# Patient Record
Sex: FEMALE | NOT HISPANIC OR LATINO | URBAN - METROPOLITAN AREA
[De-identification: names, ages, dates, MRNs, and addresses within clinical notes are randomized per-mention and may not be internally consistent; named-entity substitution may affect disease eponyms.]

---

## 2021-10-05 ENCOUNTER — APPOINTMENT (RX ONLY)
Dept: URBAN - METROPOLITAN AREA CLINIC 251 | Facility: CLINIC | Age: 31
Setting detail: DERMATOLOGY
End: 2021-10-05

## 2021-10-05 DIAGNOSIS — L73.2 HIDRADENITIS SUPPURATIVA: ICD-10-CM | Status: INADEQUATELY CONTROLLED

## 2021-10-05 DIAGNOSIS — Z41.9 ENCOUNTER FOR PROCEDURE FOR PURPOSES OTHER THAN REMEDYING HEALTH STATE, UNSPECIFIED: ICD-10-CM

## 2021-10-05 PROCEDURE — ? ADDITIONAL NOTES

## 2021-10-05 PROCEDURE — ? PRESCRIPTION

## 2021-10-05 PROCEDURE — ? TREATMENT REGIMEN

## 2021-10-05 PROCEDURE — ? COUNSELING

## 2021-10-05 PROCEDURE — ? COSMETIC CONSULTATION: BOTOX

## 2021-10-05 PROCEDURE — 99204 OFFICE O/P NEW MOD 45 MIN: CPT

## 2021-10-05 RX ORDER — CLINDAMYCIN PHOSPHATE 10 MG/ML
1% LOTION TOPICAL QD
Qty: 1 | Refills: 2 | Status: ERX | COMMUNITY
Start: 2021-10-05

## 2021-10-05 RX ADMIN — CLINDAMYCIN PHOSPHATE 1%: 10 LOTION TOPICAL at 00:00

## 2021-10-05 ASSESSMENT — LOCATION DETAILED DESCRIPTION DERM
LOCATION DETAILED: MONS PUBIS
LOCATION DETAILED: SUBXIPHOID

## 2021-10-05 ASSESSMENT — LOCATION ZONE DERM
LOCATION ZONE: VULVA
LOCATION ZONE: TRUNK

## 2021-10-05 ASSESSMENT — LOCATION SIMPLE DESCRIPTION DERM
LOCATION SIMPLE: GROIN
LOCATION SIMPLE: ABDOMEN

## 2021-10-05 NOTE — PROCEDURE: TREATMENT REGIMEN
Detail Level: Zone
Initiate Treatment: clindamycin 1 % lotion QD
Plan: consider oral abx +/- Humira (caution with history of CA) vs referral to [facility]
Otc Regimen: Panoxyl 4% wash QD

## 2024-09-12 ENCOUNTER — OFFICE VISIT (OUTPATIENT)
Dept: RHEUMATOLOGY | Age: 34
End: 2024-09-12
Payer: COMMERCIAL

## 2024-09-12 ENCOUNTER — LAB (OUTPATIENT)
Dept: LAB | Age: 34
End: 2024-09-12

## 2024-09-12 VITALS
HEIGHT: 60 IN | HEART RATE: 75 BPM | SYSTOLIC BLOOD PRESSURE: 114 MMHG | WEIGHT: 203.4 LBS | DIASTOLIC BLOOD PRESSURE: 76 MMHG | BODY MASS INDEX: 39.93 KG/M2

## 2024-09-12 DIAGNOSIS — M25.50 MULTIPLE JOINT PAIN: ICD-10-CM

## 2024-09-12 DIAGNOSIS — R76.8 POSITIVE ANA (ANTINUCLEAR ANTIBODY): ICD-10-CM

## 2024-09-12 DIAGNOSIS — R76.8 POSITIVE ANA (ANTINUCLEAR ANTIBODY): Primary | ICD-10-CM

## 2024-09-12 LAB
ALBUMIN SERPL BCG-MCNC: 4.4 G/DL (ref 3.5–5.1)
ALP SERPL-CCNC: 82 U/L (ref 38–126)
ALT SERPL W/O P-5'-P-CCNC: 22 U/L (ref 11–66)
ANION GAP SERPL CALC-SCNC: 15 MEQ/L (ref 8–16)
AST SERPL-CCNC: 18 U/L (ref 5–40)
BASOPHILS ABSOLUTE: 0 THOU/MM3 (ref 0–0.1)
BASOPHILS NFR BLD AUTO: 0.2 %
BILIRUB SERPL-MCNC: 1.7 MG/DL (ref 0.3–1.2)
BUN SERPL-MCNC: 8 MG/DL (ref 7–22)
C3C SERPL-MCNC: 115 MG/DL (ref 90–180)
C4 SERPL-MCNC: 5 MG/DL (ref 10–40)
CALCIUM SERPL-MCNC: 9.1 MG/DL (ref 8.5–10.5)
CHLORIDE SERPL-SCNC: 99 MEQ/L (ref 98–111)
CO2 SERPL-SCNC: 25 MEQ/L (ref 23–33)
CREAT SERPL-MCNC: 0.7 MG/DL (ref 0.4–1.2)
CREAT UR-MCNC: 117.4 MG/DL
CRP SERPL-MCNC: < 0.3 MG/DL (ref 0–1)
DEPRECATED RDW RBC AUTO: 46.1 FL (ref 35–45)
EOSINOPHIL NFR BLD AUTO: 1.4 %
EOSINOPHILS ABSOLUTE: 0.1 THOU/MM3 (ref 0–0.4)
ERYTHROCYTE [DISTWIDTH] IN BLOOD BY AUTOMATED COUNT: 14.9 % (ref 11.5–14.5)
ERYTHROCYTE [SEDIMENTATION RATE] IN BLOOD BY WESTERGREN METHOD: 49 MM/HR (ref 0–20)
GFR SERPL CREATININE-BSD FRML MDRD: > 90 ML/MIN/1.73M2
GLUCOSE SERPL-MCNC: 81 MG/DL (ref 70–108)
HCT VFR BLD AUTO: 40.2 % (ref 37–47)
HGB BLD-MCNC: 12.1 GM/DL (ref 12–16)
IMM GRANULOCYTES # BLD AUTO: 0.02 THOU/MM3 (ref 0–0.07)
IMM GRANULOCYTES NFR BLD AUTO: 0.5 %
LYMPHOCYTES ABSOLUTE: 1.3 THOU/MM3 (ref 1–4.8)
LYMPHOCYTES NFR BLD AUTO: 30.7 %
MCH RBC QN AUTO: 25.7 PG (ref 26–33)
MCHC RBC AUTO-ENTMCNC: 30.1 GM/DL (ref 32.2–35.5)
MCV RBC AUTO: 85.5 FL (ref 81–99)
MONOCYTES ABSOLUTE: 0.3 THOU/MM3 (ref 0.4–1.3)
MONOCYTES NFR BLD AUTO: 7.6 %
NEUTROPHILS ABSOLUTE: 2.6 THOU/MM3 (ref 1.8–7.7)
NEUTROPHILS NFR BLD AUTO: 59.6 %
NRBC BLD AUTO-RTO: 0 /100 WBC
PLATELET # BLD AUTO: 424 THOU/MM3 (ref 130–400)
PMV BLD AUTO: 9.7 FL (ref 9.4–12.4)
POTASSIUM SERPL-SCNC: 4.3 MEQ/L (ref 3.5–5.2)
PROT SERPL-MCNC: 8.2 G/DL (ref 6.1–8)
PROT UR-MCNC: 9 MG/DL
PROT/CREAT 24H UR: 0.08 MG/G{CREAT}
RBC # BLD AUTO: 4.7 MILL/MM3 (ref 4.2–5.4)
SODIUM SERPL-SCNC: 139 MEQ/L (ref 135–145)
WBC # BLD AUTO: 4.3 THOU/MM3 (ref 4.8–10.8)

## 2024-09-12 PROCEDURE — 99204 OFFICE O/P NEW MOD 45 MIN: CPT | Performed by: INTERNAL MEDICINE

## 2024-09-12 RX ORDER — ALPRAZOLAM 0.5 MG
0.5 TABLET ORAL DAILY
COMMUNITY

## 2024-09-12 RX ORDER — LEVOTHYROXINE SODIUM 150 UG/1
TABLET ORAL
COMMUNITY

## 2024-09-12 RX ORDER — TRAZODONE HYDROCHLORIDE 50 MG/1
TABLET, FILM COATED ORAL
COMMUNITY
Start: 2016-08-01

## 2024-09-12 RX ORDER — DULOXETIN HYDROCHLORIDE 60 MG/1
60 CAPSULE, DELAYED RELEASE ORAL DAILY
COMMUNITY

## 2024-09-12 RX ORDER — OXYCODONE HYDROCHLORIDE 5 MG/1
2.5-5 TABLET ORAL 2 TIMES DAILY PRN
COMMUNITY
Start: 2023-03-17

## 2024-09-12 ASSESSMENT — ENCOUNTER SYMPTOMS
COUGH: 0
SHORTNESS OF BREATH: 0
VOMITING: 0
BLOOD IN STOOL: 0
ABDOMINAL PAIN: 0
NAUSEA: 0

## 2024-10-07 ENCOUNTER — OFFICE VISIT (OUTPATIENT)
Dept: RHEUMATOLOGY | Age: 34
End: 2024-10-07
Payer: COMMERCIAL

## 2024-10-07 VITALS
HEIGHT: 60 IN | WEIGHT: 207 LBS | HEART RATE: 70 BPM | SYSTOLIC BLOOD PRESSURE: 118 MMHG | BODY MASS INDEX: 40.64 KG/M2 | OXYGEN SATURATION: 100 % | DIASTOLIC BLOOD PRESSURE: 64 MMHG

## 2024-10-07 DIAGNOSIS — M32.9 SLE (SYSTEMIC LUPUS ERYTHEMATOSUS RELATED SYNDROME) (HCC): Primary | ICD-10-CM

## 2024-10-07 PROCEDURE — 99214 OFFICE O/P EST MOD 30 MIN: CPT | Performed by: NURSE PRACTITIONER

## 2024-10-07 RX ORDER — HYDROXYCHLOROQUINE SULFATE 200 MG/1
200 TABLET, FILM COATED ORAL DAILY
Qty: 30 TABLET | Refills: 2 | Status: SHIPPED | OUTPATIENT
Start: 2024-10-07 | End: 2025-01-05

## 2024-10-07 RX ORDER — CEPHALEXIN 500 MG/1
CAPSULE ORAL
COMMUNITY
Start: 2024-09-30 | End: 2024-10-10

## 2024-10-07 ASSESSMENT — ENCOUNTER SYMPTOMS
EYE PAIN: 0
BLOOD IN STOOL: 0
BACK PAIN: 0
COLOR CHANGE: 0
DIARRHEA: 0
TROUBLE SWALLOWING: 0
SHORTNESS OF BREATH: 0
ABDOMINAL PAIN: 0

## 2024-10-07 ASSESSMENT — JOINT PAIN
TOTAL NUMBER OF SWOLLEN JOINTS: 0
TOTAL NUMBER OF TENDER JOINTS: 4

## 2024-10-07 NOTE — PROGRESS NOTES
[facility]   Rheumatology Clinic Note      10/7/2024       CHIEF COMPLAINT:    Chief Complaint   Patient presents with    Follow-up     2 week f/u positive ZAIDA  She is having pain in her back. Her pain is a 5.           HISTORY OF PRESENT ILLNESS:    34 y.o. female with systemic lupus erythematosus presents for follow up. Patient had a strong positive ZAIDA, +SSA, +CLP, low + RF, low C3.    Patient endorses bilateral hand and finger pain. She notes intermittent swelling to the left hand fingers. Patient has morning stiffness that lasts approximately 4-5 hours. She states after further reflection, since her last visit, she does note that she gets oral ulcers. She gets these about once a week.     Denies skin rash, photosensitivity, and Raynaud's.    She states she saw ENT last week and was told she has a right parotid mass for which she was given Keflex. She says they did a biopsy and it was not malignant. Patient is supposed to have a CT Scan done.    Patient has a history of hidradenitis suppurativa which she typically has in the groin region and armpits.    Recap:  Hx of chronic back pain 2/2 DDD 2/2 radiation therapy, follows with pain management.  Hx of alopecia areata in the past, but these areas have grown back.  Hx of pheochromocytoma 2003 (age 12) with mets to the spine. She underwent surgical intervention followed by MIBG I-131 therapy & radiation therapy to the spine.   Hx of papillary carcinoma of the thyroid gland 2022 which was treated with 125mCi I-131.    Strong +ZAIDA, +SSA, +CLP, low +RF, low C3    Past Medical History:     has a past medical history of Endometriosis, Parotid mass, Pheochromocytoma, and Thyroid cancer (HCC).    Past Surgical History:     has a past surgical history that includes Adrenal gland surgery and Thyroidectomy.    Current Medications:      Current Outpatient Medications:     naloxone (NARCAN) 4 MG/0.1ML LIQD nasal spray, If unresponsive, call 911 and give one spray

## 2024-11-16 LAB
C3 COMPLEMENT: 120 MG/DL (ref 90–180)
C4 COMPLEMENT: 6 MG/DL (ref 10–40)

## 2024-11-18 LAB — DSDNA ANTIBODY: <201 EIA

## 2024-11-20 ENCOUNTER — OFFICE VISIT (OUTPATIENT)
Dept: RHEUMATOLOGY | Age: 34
End: 2024-11-20
Payer: COMMERCIAL

## 2024-11-20 VITALS
DIASTOLIC BLOOD PRESSURE: 68 MMHG | HEIGHT: 60 IN | SYSTOLIC BLOOD PRESSURE: 120 MMHG | HEART RATE: 62 BPM | WEIGHT: 207.01 LBS | BODY MASS INDEX: 40.64 KG/M2

## 2024-11-20 DIAGNOSIS — M32.9 SLE (SYSTEMIC LUPUS ERYTHEMATOSUS RELATED SYNDROME) (HCC): Primary | ICD-10-CM

## 2024-11-20 PROCEDURE — 99214 OFFICE O/P EST MOD 30 MIN: CPT | Performed by: INTERNAL MEDICINE

## 2024-11-20 ASSESSMENT — ENCOUNTER SYMPTOMS
TROUBLE SWALLOWING: 0
SHORTNESS OF BREATH: 0
BLOOD IN STOOL: 0
ABDOMINAL PAIN: 0
COLOR CHANGE: 0
DIARRHEA: 0
EYE PAIN: 0
BACK PAIN: 0

## 2024-11-20 ASSESSMENT — JOINT PAIN
TOTAL NUMBER OF SWOLLEN JOINTS: 0
TOTAL NUMBER OF TENDER JOINTS: 4

## 2024-11-20 NOTE — PROGRESS NOTES
[facility] RHEUMATOLOGY  [address]  [address]  [place] [id number]  [phone]

## 2024-12-30 DIAGNOSIS — M32.9 SLE (SYSTEMIC LUPUS ERYTHEMATOSUS RELATED SYNDROME) (HCC): ICD-10-CM

## 2025-01-02 RX ORDER — HYDROXYCHLOROQUINE SULFATE 200 MG/1
200 TABLET, FILM COATED ORAL DAILY
Qty: 30 TABLET | Refills: 2 | Status: SHIPPED | OUTPATIENT
Start: 2025-01-02 | End: 2025-04-02

## 2025-03-31 DIAGNOSIS — M32.9 SLE (SYSTEMIC LUPUS ERYTHEMATOSUS RELATED SYNDROME) (HCC): ICD-10-CM

## 2025-03-31 RX ORDER — HYDROXYCHLOROQUINE SULFATE 200 MG/1
200 TABLET, FILM COATED ORAL DAILY
Qty: 90 TABLET | Refills: 1 | Status: SHIPPED | OUTPATIENT
Start: 2025-03-31 | End: 2025-09-27

## 2025-05-19 LAB
ALBUMIN: 4.3 G/DL
ALP BLD-CCNC: 72 U/L
ALT SERPL-CCNC: 32 U/L
ANION GAP SERPL CALCULATED.3IONS-SCNC: 8 MMOL/L
AST SERPL-CCNC: 31 U/L
BASOPHILS ABSOLUTE: 0 /ΜL
BASOPHILS RELATIVE PERCENT: 0.6 %
BILIRUB SERPL-MCNC: 1.2 MG/DL (ref 0.1–1.4)
BUN BLDV-MCNC: 10 MG/DL
C-REACTIVE PROTEIN: 5
CALCIUM SERPL-MCNC: 9.3 MG/DL
CHLORIDE BLD-SCNC: 103 MMOL/L
CO2: 24 MMOL/L
CREAT SERPL-MCNC: 0.8 MG/DL
EOSINOPHILS ABSOLUTE: 0.2 /ΜL
EOSINOPHILS RELATIVE PERCENT: 3.9 %
GFR, ESTIMATED: 90
GLUCOSE BLD-MCNC: 94 MG/DL
HCT VFR BLD CALC: 33.1 % (ref 36–46)
HEMOGLOBIN: 9.8 G/DL (ref 12–16)
LYMPHOCYTES ABSOLUTE: 1.2 /ΜL
LYMPHOCYTES RELATIVE PERCENT: 22.7 %
MCH RBC QN AUTO: 21.1 PG
MCHC RBC AUTO-ENTMCNC: 29.6 G/DL
MCV RBC AUTO: 71.3 FL
MONOCYTES ABSOLUTE: 0.4 /ΜL
MONOCYTES RELATIVE PERCENT: 8.4 %
NEUTROPHILS ABSOLUTE: 3.3 /ΜL
NEUTROPHILS RELATIVE PERCENT: 64.2 %
PDW BLD-RTO: 38.1 %
PLATELET # BLD: 474 K/ΜL
PMV BLD AUTO: 9.4 FL
POTASSIUM SERPL-SCNC: 4.5 MMOL/L
RBC # BLD: 4.64 10^6/ΜL
SED RATE, AUTOMATED: 35
SODIUM BLD-SCNC: 135 MMOL/L
TOTAL PROTEIN: 8.3 G/DL (ref 6.4–8.2)
WBC # BLD: 5.1 10^3/ML

## 2025-05-20 ENCOUNTER — OFFICE VISIT (OUTPATIENT)
Age: 35
End: 2025-05-20
Payer: COMMERCIAL

## 2025-05-20 VITALS
HEART RATE: 82 BPM | BODY MASS INDEX: 40.23 KG/M2 | SYSTOLIC BLOOD PRESSURE: 116 MMHG | HEIGHT: 60 IN | WEIGHT: 204.9 LBS | OXYGEN SATURATION: 96 % | DIASTOLIC BLOOD PRESSURE: 78 MMHG

## 2025-05-20 DIAGNOSIS — M32.9 SLE (SYSTEMIC LUPUS ERYTHEMATOSUS RELATED SYNDROME) (HCC): Primary | ICD-10-CM

## 2025-05-20 PROCEDURE — 99214 OFFICE O/P EST MOD 30 MIN: CPT | Performed by: NURSE PRACTITIONER

## 2025-05-20 ASSESSMENT — ENCOUNTER SYMPTOMS
ABDOMINAL PAIN: 0
DIARRHEA: 0
SHORTNESS OF BREATH: 0
BLOOD IN STOOL: 0
TROUBLE SWALLOWING: 0
BACK PAIN: 1
EYE PAIN: 0
COLOR CHANGE: 0

## 2025-05-20 NOTE — PROGRESS NOTES
[facility]   Rheumatology Clinic Note      5/20/2025       CHIEF COMPLAINT:    Chief Complaint   Patient presents with    6 Month Follow-Up     SLE (systemic lupus erythematosus related syndrome) (HCC)    Joint Pain     Back pain  Pain level 4            HISTORY OF PRESENT ILLNESS:    34 y.o. female with systemic lupus erythematosus (strong +ZAIDA, +SSA, +CLP, + RF, low C3)  presents for follow up. Presently she is on hydroxychloroquine 200 mg daily.    Overall the patient is doing well. She continues to have chronic low back pain. Denies other joint pain. Denies episodes of painful, swollen joints over the last 6 months. She has morning stiffness that lasts an hour which is much improved. She has oral ulcers a couple times a month. Denies skin rash, but states she was in [place] and noticed sensitivity to the heat/sun.    She was recently treated for pneumonia. Patient states she was recently diagnosed with a rare type of cervical cancer. She has an appointment with oncology, at [facility], next week.    Recap:  Hx of chronic back pain 2/2 DDD 2/2 radiation therapy, follows with pain management.  Hx of alopecia areata in the past, but these areas have grown back.  Hx of pheochromocytoma 2003 (age 12) with mets to the spine. She underwent surgical intervention followed by MIBG I-131 therapy & radiation therapy to the spine.   Hx of papillary carcinoma of the thyroid gland 2022 which was treated with 125mCi I-131  Hx of hidradenitis suppurativa- typically in the groin region & armpits  Follows with ENT for right parotid gland swelling- recent CT did not show any masses or nodules in the parotid gland  AVISE- strong +ZAIDA, +SSA, +CLP, low +RF, low C3    Past Medical History:     has a past medical history of Cervical cancer (HCC), Endometriosis, Parotid mass, Pheochromocytoma, and Thyroid cancer (HCC).    Past Surgical History:     has a past surgical history that includes Adrenal gland surgery and